# Patient Record
Sex: FEMALE | Race: BLACK OR AFRICAN AMERICAN | NOT HISPANIC OR LATINO | URBAN - METROPOLITAN AREA
[De-identification: names, ages, dates, MRNs, and addresses within clinical notes are randomized per-mention and may not be internally consistent; named-entity substitution may affect disease eponyms.]

---

## 2017-01-14 ENCOUNTER — EMERGENCY (EMERGENCY)
Facility: HOSPITAL | Age: 27
LOS: 1 days | Discharge: HOME | End: 2017-01-14
Attending: EMERGENCY MEDICINE | Admitting: EMERGENCY MEDICINE
Payer: MEDICAID

## 2017-01-14 VITALS
TEMPERATURE: 97 F | SYSTOLIC BLOOD PRESSURE: 119 MMHG | OXYGEN SATURATION: 100 % | DIASTOLIC BLOOD PRESSURE: 74 MMHG | WEIGHT: 117.07 LBS | HEART RATE: 92 BPM | RESPIRATION RATE: 18 BRPM | HEIGHT: 66 IN

## 2017-01-14 DIAGNOSIS — Z79.1 LONG TERM (CURRENT) USE OF NON-STEROIDAL ANTI-INFLAMMATORIES (NSAID): ICD-10-CM

## 2017-01-14 DIAGNOSIS — M54.5 LOW BACK PAIN: ICD-10-CM

## 2017-01-14 DIAGNOSIS — O99.89 OTHER SPECIFIED DISEASES AND CONDITIONS COMPLICATING PREGNANCY, CHILDBIRTH AND THE PUERPERIUM: ICD-10-CM

## 2017-01-14 DIAGNOSIS — Z79.899 OTHER LONG TERM (CURRENT) DRUG THERAPY: ICD-10-CM

## 2017-01-14 PROCEDURE — 99282 EMERGENCY DEPT VISIT SF MDM: CPT

## 2017-01-14 RX ORDER — ACETAMINOPHEN 500 MG
650 TABLET ORAL ONCE
Qty: 0 | Refills: 0 | Status: COMPLETED | OUTPATIENT
Start: 2017-01-14 | End: 2017-01-14

## 2017-01-14 RX ORDER — ACETAMINOPHEN 500 MG
1 TABLET ORAL
Qty: 20 | Refills: 0 | OUTPATIENT
Start: 2017-01-14

## 2017-01-14 RX ADMIN — Medication 650 MILLIGRAM(S): at 16:37

## 2017-01-14 NOTE — ED ADULT TRIAGE NOTE - CHIEF COMPLAINT QUOTE
here for lower back pain x 1 month- pt also with recent positive pregnancy on 12/28/16- LMP- 11/18/16- denies any injury or trauma, urinary symptoms

## 2017-01-14 NOTE — ED PROVIDER NOTE - MEDICAL DECISION MAKING DETAILS
LBP, likely MSK.  Does not appear to be related to the pregnancy.  There is no abd pain or /GYN complaint.  Has OB f/u.  Will treat with Tylenol and send Rx for proper dosing.  Will use prn.  Emergent return precautions discussed.

## 2017-01-14 NOTE — ED PROVIDER NOTE - NS ED MD SCRIBE ATTENDING SCRIBE SECTIONS
RESULTS/REVIEW OF SYSTEMS/CONSULTATIONS/SHIFT CHANGE/HIV/INTAKE ASSESSMENT/SCREENINGS/PAST MEDICAL/SURGICAL/SOCIAL HISTORY/PROGRESS NOTE/HISTORY OF PRESENT ILLNESS/PHYSICAL EXAM/DISPOSITION/VITAL SIGNS( Pullset)

## 2017-01-14 NOTE — ED PROVIDER NOTE - OBJECTIVE STATEMENT
27 yo F 8 week pregnant female with no PMHx presents c/o back pain. Pt states has had lower back pain that started a month ago, but has worsened over the past few days radiating upward. 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 Denies bowel incontinence, N/V or diarrhea. LNMP Novembr 13th. Pt notes has wanted to take Tylenol, but was unsure if could do so with pregnancy so came to ER. 25 yo F 8 week pregnant female with no PMHx presents c/o back pain. Pt states has had lower back pain that started a month ago, but has worsened over the past few days radiating upward. 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 Denies bowel/bladder incontinence, Numbness or weakness. LMP Novembr 13th. Pt notes has wanted to take Tylenol, but was unsure if could do so with pregnancy so came to ER.  has f/u appt with OB this coming friday.  Denies any vaginal d/c or bleeding.  No abd pain.

## 2022-04-15 NOTE — ED PROVIDER NOTE - TOBACCO USE
Quality 226: Preventive Care And Screening: Tobacco Use: Screening And Cessation Intervention: Patient screened for tobacco use and is an ex/non-smoker Quality 130: Documentation Of Current Medications In The Medical Record: Current Medications Documented Quality 431: Preventive Care And Screening: Unhealthy Alcohol Use - Screening: Patient not identified as an unhealthy alcohol user when screened for unhealthy alcohol use using a systematic screening method Detail Level: Detailed Unknown if ever smoked